# Patient Record
Sex: FEMALE | URBAN - METROPOLITAN AREA
[De-identification: names, ages, dates, MRNs, and addresses within clinical notes are randomized per-mention and may not be internally consistent; named-entity substitution may affect disease eponyms.]

---

## 2017-06-13 ENCOUNTER — OUTPATIENT (OUTPATIENT)
Dept: OUTPATIENT SERVICES | Facility: HOSPITAL | Age: 27
LOS: 1 days | Discharge: HOME | End: 2017-06-13

## 2017-06-28 DIAGNOSIS — N76.0 ACUTE VAGINITIS: ICD-10-CM

## 2017-06-28 DIAGNOSIS — Z12.4 ENCOUNTER FOR SCREENING FOR MALIGNANT NEOPLASM OF CERVIX: ICD-10-CM

## 2018-01-05 ENCOUNTER — OUTPATIENT (OUTPATIENT)
Dept: OUTPATIENT SERVICES | Facility: HOSPITAL | Age: 28
LOS: 1 days | Discharge: HOME | End: 2018-01-05

## 2018-01-05 DIAGNOSIS — N76.0 ACUTE VAGINITIS: ICD-10-CM

## 2018-07-15 ENCOUNTER — OUTPATIENT (OUTPATIENT)
Dept: OUTPATIENT SERVICES | Facility: HOSPITAL | Age: 28
LOS: 1 days | Discharge: HOME | End: 2018-07-15

## 2018-07-16 DIAGNOSIS — Z11.3 ENCOUNTER FOR SCREENING FOR INFECTIONS WITH A PREDOMINANTLY SEXUAL MODE OF TRANSMISSION: ICD-10-CM

## 2018-07-16 DIAGNOSIS — N76.0 ACUTE VAGINITIS: ICD-10-CM

## 2018-08-26 ENCOUNTER — OUTPATIENT (OUTPATIENT)
Dept: OUTPATIENT SERVICES | Facility: HOSPITAL | Age: 28
LOS: 1 days | Discharge: HOME | End: 2018-08-26

## 2018-08-26 DIAGNOSIS — B37.3 CANDIDIASIS OF VULVA AND VAGINA: ICD-10-CM

## 2022-08-27 ENCOUNTER — EMERGENCY (EMERGENCY)
Facility: HOSPITAL | Age: 32
LOS: 1 days | Discharge: HOME | End: 2022-08-27
Attending: EMERGENCY MEDICINE | Admitting: EMERGENCY MEDICINE
Payer: COMMERCIAL

## 2022-08-27 VITALS
TEMPERATURE: 98 F | OXYGEN SATURATION: 99 % | WEIGHT: 130.07 LBS | SYSTOLIC BLOOD PRESSURE: 104 MMHG | RESPIRATION RATE: 18 BRPM | HEIGHT: 66 IN | HEART RATE: 76 BPM | DIASTOLIC BLOOD PRESSURE: 61 MMHG

## 2022-08-27 PROCEDURE — 99283 EMERGENCY DEPT VISIT LOW MDM: CPT

## 2022-08-27 PROCEDURE — 99282 EMERGENCY DEPT VISIT SF MDM: CPT

## 2022-08-27 NOTE — ED PROVIDER NOTE - CPE EDP CARDIAC NORM
normal...
Implemented All Universal Safety Interventions:  [place] to call system. Call bell, personal items and telephone within reach. Instruct patient to call for assistance. Room bathroom lighting operational. Non-slip footwear when patient is off stretcher. Physically safe environment: no spills, clutter or unnecessary equipment. Stretcher in lowest position, wheels locked, appropriate side rails in place.

## 2022-08-27 NOTE — ED PROVIDER NOTE - PATIENT PORTAL LINK FT
You can access the FollowMyHealth Patient Portal offered by [facility] by registering at the following website: [email or web address]. By joining [email or web address]’s FollowMyHealth portal, you will also be able to view your health information using other applications (apps) compatible with our system.

## 2022-08-27 NOTE — ED PROVIDER NOTE - RELIEVING FACTORS
Phone Number Called: [phone] (home)       Message: left message for patient to call clinic for results    Left Message for patient to call back: yes         immobilization

## 2022-08-27 NOTE — ED ADULT NURSE NOTE - OBJECTIVE STATEMENT
received pt from walk in triage ambulating with a right knee brace bearing full weight reports falling off a ladder last Monday.  states she had negative xray was seen by ortho and given a rx for an out pt MRI.

## 2022-08-27 NOTE — ED PROVIDER NOTE - OBJECTIVE STATEMENT
31 y/o F with right knee pain after falling off a ladder 4 days ago.  pt has prescription for an MRI.  ambulating without difficulty with knee brace in place.

## 2022-08-27 NOTE — ED ADULT TRIAGE NOTE - CHIEF COMPLAINT QUOTE
Patient complaining of right knee pain after falling down a ladder on monday and ended up twisting her knee. Patient requesting MRI due to continued pain.

## 2022-08-27 NOTE — ED PROVIDER NOTE - NSCAREINITIATED _GEN_ER
Color consistent with ethnicity/race, warm, dry intact, resilient.
Hilaria Guaman(Attending)
Improved

## 2022-08-30 PROBLEM — Z00.00 ENCOUNTER FOR PREVENTIVE HEALTH EXAMINATION: Status: ACTIVE | Noted: 2022-08-30

## 2022-09-06 ENCOUNTER — APPOINTMENT (OUTPATIENT)
Dept: MRI IMAGING | Facility: CLINIC | Age: 32
End: 2022-09-06

## 2022-10-21 NOTE — ED ADULT TRIAGE NOTE - DOMESTIC TRAVEL HIGH RISK QUESTION
Comment: Please look at photo for additional products patient brought in that were applied in the last patch 1-7. Render Risk Assessment In Note?: no Detail Level: Simple No